# Patient Record
Sex: FEMALE | Race: WHITE | Employment: UNEMPLOYED | URBAN - METROPOLITAN AREA
[De-identification: names, ages, dates, MRNs, and addresses within clinical notes are randomized per-mention and may not be internally consistent; named-entity substitution may affect disease eponyms.]

---

## 2019-03-25 ENCOUNTER — HOSPITAL ENCOUNTER (INPATIENT)
Age: 34
LOS: 1 days | Discharge: HOME | End: 2019-03-26
Attending: OBSTETRICS & GYNECOLOGY | Admitting: OBSTETRICS & GYNECOLOGY
Payer: OTHER GOVERNMENT

## 2019-03-25 PROCEDURE — 75410000002 HC LABOR FEE PER 1 HR

## 2019-03-25 PROCEDURE — 75410000003 HC RECOV DEL/VAG/CSECN EA 0.5 HR

## 2019-03-25 PROCEDURE — 74011250636 HC RX REV CODE- 250/636: Performed by: ADVANCED PRACTICE MIDWIFE

## 2019-03-25 PROCEDURE — 65270000029 HC RM PRIVATE

## 2019-03-25 PROCEDURE — 74011250637 HC RX REV CODE- 250/637: Performed by: ADVANCED PRACTICE MIDWIFE

## 2019-03-25 PROCEDURE — 75410000014 HC MIDWIFREY DEL SVC

## 2019-03-25 PROCEDURE — 0HQ9XZZ REPAIR PERINEUM SKIN, EXTERNAL APPROACH: ICD-10-PCS | Performed by: ADVANCED PRACTICE MIDWIFE

## 2019-03-25 RX ORDER — MISOPROSTOL 200 UG/1
TABLET ORAL
Status: DISPENSED
Start: 2019-03-25 | End: 2019-03-26

## 2019-03-25 RX ORDER — HYDROCORTISONE 10 MG/G
CREAM TOPICAL AS NEEDED
Status: DISCONTINUED | OUTPATIENT
Start: 2019-03-25 | End: 2019-03-26 | Stop reason: HOSPADM

## 2019-03-25 RX ORDER — MISOPROSTOL 200 UG/1
800 TABLET ORAL
Status: ACTIVE | OUTPATIENT
Start: 2019-03-25 | End: 2019-03-26

## 2019-03-25 RX ORDER — LIDOCAINE HYDROCHLORIDE 10 MG/ML
20 INJECTION, SOLUTION EPIDURAL; INFILTRATION; INTRACAUDAL; PERINEURAL AS NEEDED
Status: DISCONTINUED | OUTPATIENT
Start: 2019-03-25 | End: 2019-03-26 | Stop reason: HOSPADM

## 2019-03-25 RX ORDER — ACETAMINOPHEN 325 MG/1
325-650 TABLET ORAL
Status: DISCONTINUED | OUTPATIENT
Start: 2019-03-25 | End: 2019-03-26 | Stop reason: HOSPADM

## 2019-03-25 RX ORDER — CHOLECALCIFEROL (VITAMIN D3) 125 MCG
1 CAPSULE ORAL DAILY
COMMUNITY

## 2019-03-25 RX ORDER — OXYTOCIN 10 [USP'U]/ML
10 INJECTION, SOLUTION INTRAMUSCULAR; INTRAVENOUS
Status: ACTIVE | OUTPATIENT
Start: 2019-03-25 | End: 2019-03-26

## 2019-03-25 RX ORDER — SALICYLIC ACID
90 POWDER (GRAM) MISCELLANEOUS ONCE
Status: ACTIVE | OUTPATIENT
Start: 2019-03-25 | End: 2019-03-26

## 2019-03-25 RX ORDER — SENNOSIDES 8.6 MG/1
2 TABLET ORAL
Status: DISCONTINUED | OUTPATIENT
Start: 2019-03-25 | End: 2019-03-26 | Stop reason: HOSPADM

## 2019-03-25 RX ORDER — OXYTOCIN 10 [USP'U]/ML
10 INJECTION, SOLUTION INTRAMUSCULAR; INTRAVENOUS
Status: COMPLETED | OUTPATIENT
Start: 2019-03-25 | End: 2019-03-25

## 2019-03-25 RX ORDER — IBUPROFEN 600 MG/1
600 TABLET ORAL
Status: DISCONTINUED | OUTPATIENT
Start: 2019-03-25 | End: 2019-03-25

## 2019-03-25 RX ORDER — MISOPROSTOL 200 UG/1
800 TABLET ORAL
Status: DISCONTINUED | OUTPATIENT
Start: 2019-03-25 | End: 2019-03-25 | Stop reason: SDUPTHER

## 2019-03-25 RX ADMIN — SENNOSIDES 17.2 MG: 8.6 TABLET, FILM COATED ORAL at 22:31

## 2019-03-25 RX ADMIN — LIDOCAINE HYDROCHLORIDE 10 ML: 10 INJECTION, SOLUTION EPIDURAL; INFILTRATION; INTRACAUDAL; PERINEURAL at 16:54

## 2019-03-25 RX ADMIN — OXYTOCIN 10 UNITS: 10 INJECTION, SOLUTION INTRAMUSCULAR; INTRAVENOUS at 16:54

## 2019-03-25 RX ADMIN — ACETAMINOPHEN 650 MG: 325 TABLET, FILM COATED ORAL at 22:31

## 2019-03-25 RX ADMIN — ACETAMINOPHEN 650 MG: 325 TABLET, FILM COATED ORAL at 18:10

## 2019-03-25 NOTE — H&P
History & Physical 
 
Name: Joya Ahumada MRN: [id number]  SSN: [id number] YOB: 1985  Age: 35 y.o. Sex: female Subjective:  
 
Estimated Date of Delivery: None noted. OB History Kendall Johnathan 1 Para Term  AB Living SAB  
   
 TAB Ectopic Molar Multiple Live Births Ms. Jenniffer Zelaya is a  admitted with pregnancy at 39.4wks for active labor and SROM to clear fluid @ 0900 this AM.  On admission, SVE 9cm/100%/+1. Prenatal course was complicated by anemia (27TQ Hgb 10.3, iron supplementation, 35wk Hgb 11.4). Prenatal care has been followed by [facility]. Please see prenatal records for details. No past medical history on file. No past surgical history on file. Social History Occupational History  Not on file Tobacco Use  Smoking status: Not on file Substance and Sexual Activity  Alcohol use: Not on file  Drug use: Not on file  Sexual activity: Not on file No family history on file. Allergies Allergen Reactions  Ibuprofen Anaphylaxis Prior to Admission medications Not on File Review of Systems: A comprehensive review of systems was negative. Objective:  
 
Vitals: 
See nurse's notes. Physical Exam: 
Patient with normal distress of labor. Abdomen: soft, nontender Fundus: soft and non tender Perineum: blood absent, clear amniotic fluid present Cervical Exam: 9 cm dilated 100% effaced +1 station Presenting Part: cephalic Lower Extremities:  No Edema Membranes:  Spontaneous Rupture of Membranes; Amniotic Fluid: clear fluid Fetal Heart Rate: Baseline: 130 per minute Variability: moderate Accelerations: yes Decelerations: none Uterine contractions: regular, every 3-5 minutes Prenatal Labs:  
B Positive, Rubella Immune, HBsAg neg, HIV neg, RPR non-reactive, GC/CT neg Group B Strep was negative. Assessment/Plan:  
 
Assessment: Term IUP @ 39.4wks, SROM clear fluid @ 0900, GBS Negative, cat 1 FHT Plan: Admit for active labor, anticipate . Signed By:  Lieutenant Florentino CNM 2019

## 2019-03-25 NOTE — L&D DELIVERY NOTE
Delivery Summary    Patient: Pepito Loaiza MRN: [id number]  SSN: [id number]    YOB: 1985  Age: 35 y.o. Sex: female       Information for the patient's :  Brennan Henriuqez [[id number]]     Gilbert Kang arrived at the [facility] at 9cm/100/+1, H&K with birth ball for support, began spontaneously bearing down with contractions. Head delivered with one smooth push, no nuchal cord, body delivered with next push. Vigorous baby boy with Apgars 9&9 passed through Chari's legs and then lifted by Gilbert Kang to her chest.  Assisted into a reclined position, cord clamping delayed until placental transfusion complete and then double clamped for cut by louie. Placenta delivered shortly afterward via Schultze maneuver, 3VC with central insertion, membranes/placenta intact. First degree perineal tear repaired with 3-0 vicryl suture under local anesthetic. Fundus firm @ 0 station, bleeding mild. Infant skin to skin on maternal chest. Dyad stable. Dr Tony Rojas notified of delivery. Labor Events:    Labor: No   Rupture Date: 3/25/2019   Rupture Time: 9:00 AM   Rupture Type SROM   Amniotic Fluid Volume: Moderate    Amniotic Fluid Description: Clear None   Induction: None       Augmentation: None   Labor Events: None     Cervical Ripening:     None     Delivery Events:  Episiotomy:     Laceration(s):       Repaired:      Number of Repair Packets:     Suture Type and Size:       Estimated Blood Loss (ml):  350 ml       Delivery Date: 3/25/2019    Delivery Time: 4:35 PM  Delivery Type:    Sex:  Male     Gestational Age: 39.1  Delivery Clinician:  Carmen Maki  Living Status: Living   Delivery Location: &D [facility]          APGARS  One minute Five minutes Ten minutes   Skin color: 1   1        Heart rate: 2   2        Grimace: 2   2        Muscle tone: 2   2        Breathin   2        Totals: 9   9            Presentation:      Position:        Resuscitation Method:  Suctioning-bulb; Tactile Stimulation Meconium Stained: Other (Comment) Small amount of terminal meconium    Cord Information:    Complications:    Cord around:    Delayed cord clamping? Cord clamped date/time:   Disposition of Cord Blood:      Blood Gases Sent?:      Placenta:  Date/Time:  3/25/19 @ 1644  Removal:     Spontaneous   Appearance:  Normal     [place] Measurements:  Birth Weight: Pending     Other Providers:   Marlen MAX;EVA CULLEN;ALEXANDRA BURR, Primary Nurse;Primary [place] Nurse;Midwife;Midlevel Student           Group B Strep: No results found for: BRENDAN Munoz  Information for the patient's :  Milagro Akbar [[id number]]   No results found for: ABORH, PCTABR, PCTDIG, BILI, ABORHEXT, ABORH    No results for input(s): PCO2CB, PO2CB, HCO3I, SO2I, IBD, PTEMPI, SPECTI, PHICB, ISITE, IDEV, IALLEN in the last 72 hours.

## 2019-03-25 NOTE — ROUTINE PROCESS
Bedside and Verbal shift change report given to Hakan Dunham RN (oncoming nurse) by Gloria Richardson RN (offgoing nurse). Report included the following information SBAR, Kardex, Intake/Output, MAR and Recent Results.

## 2019-03-25 NOTE — PROGRESS NOTES
1618: Pt presents to L&D. Wheeled up from ER by this RN. Placed in room 23. Pt feels urge to push. CNM in room. Student CNM in room. Cervix 9 cm. AROM at 0900 at home. Clear fluid. External monitors applied. Being held by RN as patient in hands and knees. 1635:  of VMI. No complications. 1644: Delivery of placenta. 1645: Laceration identified. IM pitocin given in left arm. Lidocaine by CNM. Repair in progress.

## 2019-03-26 VITALS
HEART RATE: 76 BPM | DIASTOLIC BLOOD PRESSURE: 81 MMHG | TEMPERATURE: 98.1 F | RESPIRATION RATE: 18 BRPM | SYSTOLIC BLOOD PRESSURE: 122 MMHG

## 2019-03-26 LAB
ABO + RH BLD: NORMAL
BASOPHILS # BLD: 0 K/UL (ref 0–0.1)
BASOPHILS NFR BLD: 0 % (ref 0–2)
BLOOD GROUP ANTIBODIES SERPL: NORMAL
DIFFERENTIAL METHOD BLD: ABNORMAL
EOSINOPHIL # BLD: 0.1 K/UL (ref 0–0.4)
EOSINOPHIL NFR BLD: 1 % (ref 0–5)
ERYTHROCYTE [DISTWIDTH] IN BLOOD BY AUTOMATED COUNT: 13.9 % (ref 11.6–14.5)
HCT VFR BLD AUTO: 34.7 % (ref 35–45)
HGB BLD-MCNC: 11.6 G/DL (ref 12–16)
LYMPHOCYTES # BLD: 2.6 K/UL (ref 0.9–3.6)
LYMPHOCYTES NFR BLD: 18 % (ref 21–52)
MCH RBC QN AUTO: 30.4 PG (ref 24–34)
MCHC RBC AUTO-ENTMCNC: 33.4 G/DL (ref 31–37)
MCV RBC AUTO: 90.8 FL (ref 74–97)
MONOCYTES # BLD: 1.2 K/UL (ref 0.05–1.2)
MONOCYTES NFR BLD: 9 % (ref 3–10)
NEUTS SEG # BLD: 10.3 K/UL (ref 1.8–8)
NEUTS SEG NFR BLD: 72 % (ref 40–73)
PLATELET # BLD AUTO: 250 K/UL (ref 135–420)
PMV BLD AUTO: 10.4 FL (ref 9.2–11.8)
RBC # BLD AUTO: 3.82 M/UL (ref 4.2–5.3)
SPECIMEN EXP DATE BLD: NORMAL
WBC # BLD AUTO: 14.2 K/UL (ref 4.6–13.2)

## 2019-03-26 PROCEDURE — 74011250637 HC RX REV CODE- 250/637: Performed by: ADVANCED PRACTICE MIDWIFE

## 2019-03-26 PROCEDURE — 85025 COMPLETE CBC W/AUTO DIFF WBC: CPT

## 2019-03-26 PROCEDURE — 86900 BLOOD TYPING SEROLOGIC ABO: CPT

## 2019-03-26 PROCEDURE — 74011250637 HC RX REV CODE- 250/637: Performed by: OBSTETRICS & GYNECOLOGY

## 2019-03-26 PROCEDURE — 36415 COLL VENOUS BLD VENIPUNCTURE: CPT

## 2019-03-26 RX ORDER — DOCUSATE SODIUM 100 MG/1
100 CAPSULE, LIQUID FILLED ORAL DAILY
Status: DISCONTINUED | OUTPATIENT
Start: 2019-03-27 | End: 2019-03-26 | Stop reason: HOSPADM

## 2019-03-26 RX ORDER — DOCUSATE SODIUM 100 MG/1
100 CAPSULE, LIQUID FILLED ORAL DAILY
Status: DISCONTINUED | OUTPATIENT
Start: 2019-03-27 | End: 2019-03-26

## 2019-03-26 RX ORDER — DOCUSATE SODIUM 100 MG/1
100 CAPSULE, LIQUID FILLED ORAL
Status: DISCONTINUED | OUTPATIENT
Start: 2019-03-26 | End: 2019-03-26 | Stop reason: HOSPADM

## 2019-03-26 RX ADMIN — ACETAMINOPHEN 650 MG: 325 TABLET, FILM COATED ORAL at 17:16

## 2019-03-26 RX ADMIN — ACETAMINOPHEN 650 MG: 325 TABLET, FILM COATED ORAL at 13:02

## 2019-03-26 RX ADMIN — ACETAMINOPHEN 650 MG: 325 TABLET, FILM COATED ORAL at 04:48

## 2019-03-26 RX ADMIN — DOCUSATE SODIUM 100 MG: 100 CAPSULE, LIQUID FILLED ORAL at 18:49

## 2019-03-26 RX ADMIN — ACETAMINOPHEN 650 MG: 325 TABLET, FILM COATED ORAL at 08:52

## 2019-03-26 NOTE — DISCHARGE SUMMARY
Obstetrical Discharge Summary       [address] [facility] [address]  [address] [place] [address] [phone]        Patient ID:Chari [id number],19 y.o.,1985    Postpartum Day: Information for the patient's :  Claude Shore [[id number]]   2 days       Admit Date: 3/25/2019    Discharge Date: 3/26/2019     Admitting Physician: Gisell Rae MD     Admission Diagnoses: Labor and delivery indication for care or intervention [O75.9]    Discharge Diagnoses: same as above      Additional Diagnoses:Present on Admission:  **None**       Patient Active Problem List   Diagnosis Code    Labor and delivery indication for care or intervention O75.9         Procedure:        Baby link  Information for the patient's :  Claude Shore [[id number]]     Delivery Type: Vaginal, Spontaneous   Delivery Date: 3/25/2019   Delivery Time: 4:35 PM     Birth Weight: 3.62 kg     Sex:  male  Delivery Clinician:  Vani Conti   Gestational Age: Gestational Age: <None>    Presentation: Vertex   Position:             Apgars were 9  and 9      Resuscitation Method: Suctioning-bulb; Tactile Stimulation     Meconium Stained: Other (Comment)  Living Status: Living       Placenta Date/Time: 3/25/2019  4:44 PM   Placenta Removal: Spontaneous   Placenta Appearance: Vertex [1]     Cord Information: 3 Vessels    Cord Events: None       Cord Blood Sent?:       Blood Gases Sent?:  No         Baby procedures:    Information for the patient's :  Claude Shore [[id number]]          Feeding Method: Infant Feeding: Breastmilk    Immunizations:    Immunization History   Administered Date(s) Administered    Influenza Vaccine 2018    Tdap 2019        Immunizations:    Immunization History   Administered Date(s) Administered    Influenza Vaccine 2018    Tdap 2019       Group Beta Strep: No results found for: GRBSEXT       No results found for: WBC, HGB, PLT, HGBEXT, PLTEXT      Hospital Course: Unremarkable  Subjective:         Michele Siu desires late discharge. Breastfeeding well. No problems. Awaiting hgb result. Objective:   Breasts Nontender and intact  Fundus firm and involuting  Lochia rubra, minimal  Legs minimal to no edema and without pain. Lab/Data Review:  Patient Vitals for the past 8 hrs:   BP Temp Pulse Resp   19 0150 103/69 98.3 °F (36.8 °C) 75 16       Temp (24hrs), Av.7 °F (37.1 °C), Min:98.3 °F (36.8 °C), Max:99.1 °F (37.3 °C)      No results found for: WBC, HGB, PLT, HGBEXT, PLTEXT  Patient Instructions:   Current Discharge Medication List      CONTINUE these medications which have NOT CHANGED    Details   PNV No12-Iron-FA-DSS-OM-3 29 mg iron-1 mg -50 mg CPKD Take 1 Tab by mouth. cholecalciferol, vitamin D3, (VITAMIN D3) 2,000 unit tab Take 1 Tab by mouth daily. Assessment:     Status post: postpartum vaginal delivery   Recovering well  Breastfeeding  Mood Stable      Plan:     Postpartum care discussed including diet, ambulation,actvitiy restrictions, and mood fluctuations. Discharge instructions and questions answered for vaginal delivery.   Follow in 6 wks or prn      Signed By: Sandra Rodríguez CNM     2019

## 2019-03-26 NOTE — DISCHARGE INSTRUCTIONS

## 2019-03-26 NOTE — LACTATION NOTE
Mom states baby is nursing well. Experienced mom, breast fed first child for 2 years. Peds states baby has slight tongue-tie, discussed management. Reviewed nursing pattern, latch, cluster feeding. Offered help with feedings. Info sheet, daily log and resource list given.